# Patient Record
Sex: FEMALE | Race: OTHER | NOT HISPANIC OR LATINO
[De-identification: names, ages, dates, MRNs, and addresses within clinical notes are randomized per-mention and may not be internally consistent; named-entity substitution may affect disease eponyms.]

---

## 2017-10-11 ENCOUNTER — TRANSCRIPTION ENCOUNTER (OUTPATIENT)
Age: 37
End: 2017-10-11

## 2017-12-06 ENCOUNTER — APPOINTMENT (OUTPATIENT)
Dept: BARIATRICS | Facility: CLINIC | Age: 37
End: 2017-12-06

## 2017-12-11 ENCOUNTER — APPOINTMENT (OUTPATIENT)
Dept: BARIATRICS | Facility: CLINIC | Age: 37
End: 2017-12-11

## 2017-12-17 ENCOUNTER — TRANSCRIPTION ENCOUNTER (OUTPATIENT)
Age: 37
End: 2017-12-17

## 2018-03-28 ENCOUNTER — EMERGENCY (EMERGENCY)
Facility: HOSPITAL | Age: 38
LOS: 1 days | Discharge: HOME | End: 2018-03-28
Attending: EMERGENCY MEDICINE | Admitting: EMERGENCY MEDICINE
Payer: COMMERCIAL

## 2018-03-28 VITALS
TEMPERATURE: 98 F | OXYGEN SATURATION: 99 % | DIASTOLIC BLOOD PRESSURE: 87 MMHG | HEART RATE: 70 BPM | RESPIRATION RATE: 18 BRPM | WEIGHT: 220.02 LBS | SYSTOLIC BLOOD PRESSURE: 135 MMHG

## 2018-03-28 DIAGNOSIS — R21 RASH AND OTHER NONSPECIFIC SKIN ERUPTION: ICD-10-CM

## 2018-03-28 DIAGNOSIS — Z88.8 ALLERGY STATUS TO OTHER DRUGS, MEDICAMENTS AND BIOLOGICAL SUBSTANCES: ICD-10-CM

## 2018-03-28 DIAGNOSIS — Z98.890 OTHER SPECIFIED POSTPROCEDURAL STATES: Chronic | ICD-10-CM

## 2018-03-28 DIAGNOSIS — L25.9 UNSPECIFIED CONTACT DERMATITIS, UNSPECIFIED CAUSE: ICD-10-CM

## 2018-03-28 DIAGNOSIS — Z86.018 PERSONAL HISTORY OF OTHER BENIGN NEOPLASM: Chronic | ICD-10-CM

## 2018-03-28 PROCEDURE — 99283 EMERGENCY DEPT VISIT LOW MDM: CPT

## 2018-03-28 RX ORDER — HYDROCORTISONE 1 %
1 OINTMENT (GRAM) TOPICAL
Qty: 30 | Refills: 0 | OUTPATIENT
Start: 2018-03-28 | End: 2018-04-10

## 2018-03-28 NOTE — ED ADULT NURSE NOTE - OBJECTIVE STATEMENT
Pt c/o rash to bilateral thighs since yesterday, states she used a new soap yesterday. Denies any respiratory symptoms or itching anywhere else.

## 2018-03-28 NOTE — ED PROVIDER NOTE - OBJECTIVE STATEMENT
Patient is a 37 year old female with no significant past medical history presenting with "Rash" on Thighs.     Patient reports on Monday, March 26, Used "New Body Soap", and Noticed That Skin was Dry Afterwards. Patient reports on Tuesday, March 27, Noticed Itchiness on Anterior/Medial, Lateral Thighs Bilaterally, and Reports Itching All Day. Patient reports Today, Noticing, "Blotches" of Red on Thighs Bilaterally, and   "Feels Warm" To Touch, and Slightly Painful. Patient denies Fever, Chills, Skin Wounds on Bilateral Lower Extremities. Patient denies Extravasation of Fluid from Rashes, or Bleeding. Besides New Soap, Patient denies New Medications, New Detergents, New Clothing, Travel, or Extended Sun Exposure.     Patient denies Nausea, Vomiting, Chest Pain, SOB, Cough, Palpitations, Abdominal Pain, Diarrhea, Constipation, Dysuria, Hematuria. Patient is a 37 year old female with no significant past medical history presenting with "Rash" on Thighs.     Patient reports on Monday, March 26, Used "New Body Soap", and Noticed That Skin was Dry Afterwards. Patient reports on Tuesday, March 27, Noticed Itchiness on Anterior/Medial, Lateral Thighs Bilaterally, and Reports Itching All Day. Patient reports Today, Noticing, "Blotches" of Red on Thighs Bilaterally, and   "Feels Warm" To Touch, and Slightly Painful. Patient reports Tried "[place] Butter", Helped Dry Skin, but Still Itchy. Patient denies Fever, Chills, Skin Wounds on Bilateral Lower Extremities. Patient denies Extravasation of Fluid from Rashes, or Bleeding. Besides New Soap, Patient denies New Medications, New Detergents, New Clothing, Travel, or Extended Sun Exposure.     Patient denies Nausea, Vomiting, Chest Pain, SOB, Cough, Palpitations, Abdominal Pain, Diarrhea, Constipation, Dysuria, Hematuria.

## 2018-03-28 NOTE — ED PROVIDER NOTE - ATTENDING CONTRIBUTION TO CARE
38yo F no PMH here w/ rash on anterior medio/lateral thighs. +new soap recently. initially dry and scratchy, then became red, slightly warm, so came in for eval. no other exposures, no travel. ROS otherwise neg. VSS, on exam anterior thighs w/ mild erythema, no warmth, no bleeding, macular in appearance. likely contact dermatitis. pt tried cocoa butter. will trial low dose steroid and benadryl cream for itching, do not think required oral medications at this time, does not look like cellulitis at this time either. DC home in [facility] with strict return precautions given.

## 2018-03-28 NOTE — ED PROVIDER NOTE - PHYSICAL EXAMINATION
General: Well Appearing, NAD  HEENT: NCAT, PERRLA B/L, MMM, No Oral Lesions  Neck: Supple  Heart: Normal S1+S2, RRR, No M/R/G  Lungs: CTA B/L, No W/R/R  Abdomen: Soft, NT/ND  Extremities: +Macular Rash on Bilateral Thighs, Not Warm, No Purulence or Drainage, Non-Tender to Palpation, No Lacerations or Excoriations  Skin: As Above

## 2018-03-28 NOTE — ED PROVIDER NOTE - CHPI ED SYMPTOMS NEG
no inflammation/no bruising/no petechia/no scaly patches on skin/no chills/no decreased eating/drinking/no vomiting/no fever

## 2018-03-28 NOTE — ED PROVIDER NOTE - MEDICAL DECISION MAKING DETAILS
Based on History and Examination, likely Contact Dermatitis. Will Prescribe Topical Steroid and Benadryl, and Discharge Home.

## 2018-07-31 ENCOUNTER — TRANSCRIPTION ENCOUNTER (OUTPATIENT)
Age: 38
End: 2018-07-31

## 2018-11-01 ENCOUNTER — APPOINTMENT (OUTPATIENT)
Dept: BARIATRICS | Facility: CLINIC | Age: 38
End: 2018-11-01
Payer: COMMERCIAL

## 2018-11-01 VITALS
HEIGHT: 66 IN | DIASTOLIC BLOOD PRESSURE: 80 MMHG | OXYGEN SATURATION: 99 % | BODY MASS INDEX: 41.16 KG/M2 | HEART RATE: 73 BPM | WEIGHT: 256.13 LBS | TEMPERATURE: 98.6 F | SYSTOLIC BLOOD PRESSURE: 126 MMHG

## 2018-11-01 PROCEDURE — 99203 OFFICE O/P NEW LOW 30 MIN: CPT

## 2018-11-07 ENCOUNTER — APPOINTMENT (OUTPATIENT)
Dept: BARIATRICS | Facility: CLINIC | Age: 38
End: 2018-11-07

## 2019-01-09 ENCOUNTER — APPOINTMENT (OUTPATIENT)
Dept: BARIATRICS | Facility: CLINIC | Age: 39
End: 2019-01-09

## 2019-01-09 VITALS — BODY MASS INDEX: 41.3 KG/M2 | WEIGHT: 257 LBS | HEIGHT: 66 IN

## 2019-01-10 ENCOUNTER — APPOINTMENT (OUTPATIENT)
Dept: BARIATRICS | Facility: CLINIC | Age: 39
End: 2019-01-10

## 2019-03-13 ENCOUNTER — APPOINTMENT (OUTPATIENT)
Dept: OBGYN | Facility: CLINIC | Age: 39
End: 2019-03-13

## 2019-04-17 ENCOUNTER — TRANSCRIPTION ENCOUNTER (OUTPATIENT)
Age: 39
End: 2019-04-17

## 2019-09-11 ENCOUNTER — APPOINTMENT (OUTPATIENT)
Dept: OBGYN | Facility: CLINIC | Age: 39
End: 2019-09-11

## 2020-02-05 ENCOUNTER — APPOINTMENT (OUTPATIENT)
Dept: OBGYN | Facility: CLINIC | Age: 40
End: 2020-02-05
Payer: COMMERCIAL

## 2020-02-05 DIAGNOSIS — Z01.419 ENCOUNTER FOR GYNECOLOGICAL EXAMINATION (GENERAL) (ROUTINE) W/OUT ABNORMAL FINDINGS: ICD-10-CM

## 2020-02-05 DIAGNOSIS — R93.89 ABNORMAL FINDINGS ON DIAGNOSTIC IMAGING OF OTHER SPECIFIED BODY STRUCTURES: ICD-10-CM

## 2020-02-05 PROCEDURE — 99386 PREV VISIT NEW AGE 40-64: CPT

## 2020-02-05 NOTE — PHYSICAL EXAM
[Awake] : awake [Alert] : alert [Soft] : soft [Oriented x3] : oriented to person, place, and time [No Bleeding] : there was no active vaginal bleeding [Pap Obtained] : a Pap smear was performed [Uterine Adnexae] : were not tender and not enlarged [Acute Distress] : no acute distress [Mass] : no breast mass [Nipple Discharge] : no nipple discharge [Axillary LAD] : no axillary lymphadenopathy [Tender] : non tender [Labia Majora] : labia major [Labia Minora] : labia minora [Normal] : clitoris [Normal Position] : in a normal position [Tenderness] : nontender [Enlarged ___ wks] : not enlarged [Adnexa Tenderness] : were not tender [Ovarian Mass (___ Cm)] : there were no adnexal masses

## 2020-02-11 LAB — CYTOLOGY CVX/VAG DOC THIN PREP: NORMAL

## 2020-04-26 ENCOUNTER — MESSAGE (OUTPATIENT)
Age: 40
End: 2020-04-26

## 2020-05-06 ENCOUNTER — APPOINTMENT (OUTPATIENT)
Dept: DISASTER EMERGENCY | Facility: HOSPITAL | Age: 40
End: 2020-05-06

## 2020-05-13 LAB
SARS-COV-2 IGG SERPL IA-ACNC: 0.1 INDEX
SARS-COV-2 IGG SERPL QL IA: NEGATIVE

## 2020-06-19 ENCOUNTER — APPOINTMENT (OUTPATIENT)
Dept: OBGYN | Facility: CLINIC | Age: 40
End: 2020-06-19
Payer: COMMERCIAL

## 2020-06-19 VITALS
WEIGHT: 262 LBS | HEIGHT: 66 IN | SYSTOLIC BLOOD PRESSURE: 120 MMHG | BODY MASS INDEX: 42.11 KG/M2 | DIASTOLIC BLOOD PRESSURE: 80 MMHG

## 2020-06-19 PROCEDURE — 76817 TRANSVAGINAL US OBSTETRIC: CPT

## 2020-06-19 PROCEDURE — 99213 OFFICE O/P EST LOW 20 MIN: CPT | Mod: 25

## 2020-06-19 NOTE — PROCEDURE
[Intrauterine Pregnancy] : intrauterine pregnancy [Yolk Sac] : yolk sac present [Fetal Heart] : fetal heart present [CRL: ___ (mm)] : CRL - [unfilled]Umm [Date: ___] : EDC: [unfilled] [Current GA by Sonogram: ___ (wks)] : Current GA by Sonogram: [unfilled]Uwks [___ day(s)] : [unfilled] days [WNL] : Transvaginal OB Sonogram WNL [FreeTextEntry1] : (+) FHR OBSERVED.  i DIDN'T USE AUDIBLE DOPPLER BECAUSE MRS. JEAN DIDN'T WANT TO SEE OR HEAR THE HEARTBEAT FOR EMOTIONAL REASONS.

## 2020-06-19 NOTE — CHIEF COMPLAINT
[Follow Up] : follow up GYN visit [FreeTextEntry1] : PATIENT PRESENT FOR GYN FOLLOW UP VISIT. PATIENT STATES SHE MISS HER MENSES FOR MAY ,2020. PATIENT LMP IS 04/15/2020. PATIENT IS 9 WEEKS 2 DAYS TODAY FROM LMP. PATIENT PREGNANCY TEST WAS POSTIVE TODAY.

## 2020-07-29 ENCOUNTER — APPOINTMENT (OUTPATIENT)
Dept: OBGYN | Facility: CLINIC | Age: 40
End: 2020-07-29
Payer: COMMERCIAL

## 2020-07-29 DIAGNOSIS — Z00.00 ENCOUNTER FOR GENERAL ADULT MEDICAL EXAMINATION W/OUT ABNORMAL FINDINGS: ICD-10-CM

## 2020-07-29 PROCEDURE — 99396 PREV VISIT EST AGE 40-64: CPT

## 2020-07-29 PROCEDURE — 36415 COLL VENOUS BLD VENIPUNCTURE: CPT

## 2020-08-11 ENCOUNTER — APPOINTMENT (OUTPATIENT)
Dept: MAMMOGRAPHY | Facility: HOSPITAL | Age: 40
End: 2020-08-11

## 2020-08-13 LAB
ANDROSTANOLONE SERPL-MCNC: 6.3 NG/DL
ESTRADIOL SERPL-MCNC: 25 PG/ML
FIBRINOGEN AG PPP IA-MCNC: 326 MG/DL
FSH SERPL-MCNC: 5.4 IU/L
HCG SERPL-MCNC: 1 MIU/ML
LH SERPL-ACNC: 3.3 IU/L
PROGEST SERPL-MCNC: 0.2 NG/ML
PROLACTIN SERPL-MCNC: 17.8 NG/ML
T3 SERPL-MCNC: 105 NG/DL
T3FREE SERPL-MCNC: 2.71 PG/ML
T4 FREE SERPL-MCNC: 0.9 NG/DL
T4 SERPL-MCNC: 5.8 UG/DL
TSH SERPL-ACNC: 1.31 UIU/ML

## 2020-08-17 NOTE — HISTORY OF PRESENT ILLNESS
[Definite:  ___ (Date)] : the last menstrual period was [unfilled] [Spotting Between  Menses] : spotting reported between menses [Normal Amount/Duration] : was of a normal amount and duration [Regular Cycle Intervals] : periods have been regular [Menstrual Cramps] : no menstrual cramps [On BCP at conception] : the patient was not on BCP at conception [Contraception] : does not use contraception

## 2020-10-15 ENCOUNTER — APPOINTMENT (OUTPATIENT)
Dept: BARIATRICS | Facility: CLINIC | Age: 40
End: 2020-10-15
Payer: COMMERCIAL

## 2020-10-15 PROCEDURE — 99214 OFFICE O/P EST MOD 30 MIN: CPT

## 2020-12-02 ENCOUNTER — EMERGENCY (EMERGENCY)
Facility: HOSPITAL | Age: 40
LOS: 1 days | Discharge: HOME | End: 2020-12-02
Admitting: EMERGENCY MEDICINE
Payer: COMMERCIAL

## 2020-12-02 VITALS
SYSTOLIC BLOOD PRESSURE: 135 MMHG | TEMPERATURE: 98 F | HEIGHT: 67 IN | HEART RATE: 90 BPM | DIASTOLIC BLOOD PRESSURE: 84 MMHG | RESPIRATION RATE: 18 BRPM | OXYGEN SATURATION: 99 %

## 2020-12-02 DIAGNOSIS — Z98.890 OTHER SPECIFIED POSTPROCEDURAL STATES: Chronic | ICD-10-CM

## 2020-12-02 DIAGNOSIS — Z86.018 PERSONAL HISTORY OF OTHER BENIGN NEOPLASM: Chronic | ICD-10-CM

## 2020-12-02 LAB — SARS-COV-2 RNA SPEC QL NAA+PROBE: SIGNIFICANT CHANGE UP

## 2020-12-02 PROCEDURE — U0003: CPT

## 2020-12-02 PROCEDURE — 99283 EMERGENCY DEPT VISIT LOW MDM: CPT

## 2020-12-02 NOTE — ED PROVIDER NOTE - PATIENT PORTAL LINK FT
You can access the FollowMyHealth Patient Portal offered by [facility] by registering at the following website: [email or web address]. By joining [employer]’s FollowMyHealth portal, you will also be able to view your health information using other applications (apps) compatible with our system.

## 2020-12-06 DIAGNOSIS — Z20.828 CONTACT WITH AND (SUSPECTED) EXPOSURE TO OTHER VIRAL COMMUNICABLE DISEASES: ICD-10-CM

## 2020-12-06 DIAGNOSIS — Z88.8 ALLERGY STATUS TO OTHER DRUGS, MEDICAMENTS AND BIOLOGICAL SUBSTANCES: ICD-10-CM

## 2020-12-24 ENCOUNTER — EMERGENCY (EMERGENCY)
Facility: HOSPITAL | Age: 40
LOS: 1 days | Discharge: HOME | End: 2020-12-24
Admitting: EMERGENCY MEDICINE
Payer: COMMERCIAL

## 2020-12-24 VITALS
TEMPERATURE: 99 F | HEIGHT: 67 IN | WEIGHT: 257.06 LBS | OXYGEN SATURATION: 97 % | DIASTOLIC BLOOD PRESSURE: 82 MMHG | HEART RATE: 80 BPM | SYSTOLIC BLOOD PRESSURE: 125 MMHG | RESPIRATION RATE: 17 BRPM
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
## 2020-12-24 DIAGNOSIS — M25.561 PAIN IN RIGHT KNEE: ICD-10-CM

## 2020-12-24 DIAGNOSIS — Z98.890 OTHER SPECIFIED POSTPROCEDURAL STATES: Chronic | ICD-10-CM
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
## 2020-12-24 DIAGNOSIS — Z86.018 PERSONAL HISTORY OF OTHER BENIGN NEOPLASM: Chronic | ICD-10-CM

## 2020-12-24 DIAGNOSIS — S83.91XA SPRAIN OF UNSPECIFIED SITE OF RIGHT KNEE, INITIAL ENCOUNTER: ICD-10-CM

## 2020-12-24 PROCEDURE — 99283 EMERGENCY DEPT VISIT LOW MDM: CPT

## 2020-12-24 RX ORDER — IBUPROFEN 200 MG
800 TABLET ORAL ONCE
Refills: 0 | Status: COMPLETED | OUTPATIENT
Start: 2020-12-24 | End: 2020-12-24

## 2020-12-24 RX ADMIN — Medication 800 MILLIGRAM(S): at 13:41

## 2020-12-24 NOTE — ED ADULT NURSE NOTE - NSIMPLEMENTINTERV_GEN_ALL_ED
Implemented All Fall Risk Interventions:  [place] to call system. Call bell, personal items and telephone within reach. Instruct patient to call for assistance. Room bathroom lighting operational. Non-slip footwear when patient is off stretcher. Physically safe environment: no spills, clutter or unnecessary equipment. Stretcher in lowest position, wheels locked, appropriate side rails in place. Provide visual cue, wrist band, yellow gown, etc. Monitor gait and stability. Monitor for mental status changes and reorient to person, place, and time. Review medications for side effects contributing to fall risk. Reinforce activity limits and safety measures with patient and family.

## 2020-12-24 NOTE — ED PROVIDER NOTE - NSFOLLOWUPINSTRUCTIONS_ED_ALL_ED_FT
KNEE SPRAIN - AfterCare(R) Instructions(ER/ED)           Knee Sprain    WHAT YOU NEED TO KNOW:    A knee sprain is a stretched or torn ligament in your knee. Ligaments support the knee and keep the joint and bones in the correct position. A knee sprain may involve one or more ligaments.    Knee Anatomy          DISCHARGE INSTRUCTIONS:    Return to the emergency department if:   •Any part of your leg feels cold, numb, or looks pale.          Call your doctor if:   •You have new or increased swelling, bruising, or pain in your knee.      •Your symptoms do not improve within 6 weeks, even with treatment.      •You have questions or concerns about your condition or care.      Medicines:   •NSAIDs, such as ibuprofen, help decrease swelling, pain, and fever. This medicine is available with or without a doctor's order. NSAIDs can cause stomach bleeding or kidney problems in certain people. If you take blood thinner medicine, always ask your healthcare provider if NSAIDs are safe for you. Always read the medicine label and follow directions.      •Acetaminophen decreases pain and fever. It is available without a doctor's order. Ask how much to take and how often to take it. Follow directions. Read the labels of all other medicines you are using to see if they also contain acetaminophen, or ask your doctor or pharmacist. Acetaminophen can cause liver damage if not taken correctly. Do not use more than 4 grams (4,000 milligrams) total of acetaminophen in one day.       •Prescription pain medicine may be given. Ask your healthcare provider how to take this medicine safely. Some prescription pain medicines contain acetaminophen. Do not take other medicines that contain acetaminophen without talking to your healthcare provider. Too much acetaminophen may cause liver damage. Prescription pain medicine may cause constipation. Ask your healthcare provider how to prevent or treat constipation.       •Take your medicine as directed. Contact your healthcare provider if you think your medicine is not helping or if you have side effects. Tell him or her if you are allergic to any medicine. Keep a list of the medicines, vitamins, and herbs you take. Include the amounts, and when and why you take them. Bring the list or the pill bottles to follow-up visits. Carry your medicine list with you in case of an emergency.      A support device such as a splint or brace may be needed. These devices limit movement and protect the joint while it heals. You may be given crutches to use until you can stand on your injured leg without pain.    Physical therapy may be needed. A physical therapist teaches you exercises to help improve movement and strength, and to decrease pain.    Manage a knee sprain:   •Rest your knee and do not exercise. Do not walk on your injured leg if you are told to keep weight off your knee. Rest helps decrease swelling and allows the injury to heal. You can do gentle range of motion exercises as directed to prevent stiffness.      •Apply ice on your knee for 15 to 20 minutes every hour or as directed. Use an ice pack, or put crushed ice in a plastic bag. Cover the bag with a towel before you apply it. Ice helps prevent tissue damage and decreases swelling and pain.      •Apply compression to your knee as directed. You may need to wear an elastic bandage. This helps keep your injured knee from moving too much while it heals. It should be tight enough to give support but so tight that it causes your toes to feel numb or tingly. Take the bandage off and rewrap it at least 1 time each day.      •Elevate your knee above the level of your heart as often as you can. This will help decrease swelling and pain. Prop your leg on pillows or blankets to keep it elevated comfortably. Do not put pillows directly behind your knee.  Elevate Leg           Prevent another knee sprain: Exercise your legs to keep your muscles strong. Strong leg muscles help protect your knee and prevent strain. The following may also prevent a knee sprain:   •Slowly start your exercise or training program. Slowly increase the time, distance, and intensity of your exercise. Sudden increases in training may cause another knee sprain.      •Wear protective braces and equipment as directed. Braces may prevent your knee from moving the wrong way and causing another sprain. Protective equipment may support your bones and ligaments to prevent injury.      •Warm up and stretch before exercise. Warm up by walking or using an exercise bike before starting your regular exercise. Do gentle stretches after warming up. This helps to loosen your muscles and decrease stress on your knee. Cool down and stretch after you exercise.      •Wear shoes that fit correctly and support your feet. Replace your running or exercise shoes before the padding or shock absorption is worn out. Ask your healthcare provider which exercise shoes are best for you. Ask if you should wear shoe inserts. Shoe inserts can help support your heels and arches or keep your foot lined up correctly in your shoes. Exercise on flat surfaces.      Follow up with your doctor as directed: Write down your questions so you remember to ask them during your visits.

## 2020-12-24 NOTE — ED ADULT TRIAGE NOTE - CHIEF COMPLAINT QUOTE
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 today states she is having difficulty ambulating on her R leg.

## 2020-12-24 NOTE — ED ADULT NURSE NOTE - OBJECTIVE STATEMENT
pt received into spot A A&Ox3 ambulatory appears comfortable arrives via walk in triage for eval of knee pain while pushing a stretcher working in [employer] and hit her knee pain has increased today and is now limping wants to get checked out. Ice packs applied. awaiting md cary in nad

## 2020-12-24 NOTE — ED PROVIDER NOTE - CLINICAL SUMMARY MEDICAL DECISION MAKING FREE TEXT BOX
39 yo female in the ER with right knee pain since yesterday. Pt reports she felt some with twisting movement of her knee yesterday at work. Denies any fall or direct knee injury. Pt concerned that today pain became worse. No deformity, edema opr discoloration noted to painful joint, no prior knee injury.  exam- no clinical suspicion for fx, dislocation, infection, or tear of the ligaments, no findings to suspect DVT. most likely sprain. plan : NSAIDs for pain, ACE bandage for support, rest, out pt f/u with ortho.

## 2020-12-24 NOTE — ED PROVIDER NOTE - MUSCULOSKELETAL, MLM
Spine appears normal, range of motion is not limited, right knee - no deformity, no effusion, no erythema, NROM with generalized tenderness, good distal pulse sand no calf tenderness.

## 2020-12-24 NOTE — ED PROVIDER NOTE - OBJECTIVE STATEMENT
39 yo female in the ER with right knee pain since yesterday. Pt reports she felt some with twisting movement of her knee yesterday at work. Denies any fall or direct knee injury. Pt concerned that today pain became worse. No deformity, edema opr discoloration noted to painful joint, no prior knee injury.

## 2020-12-24 NOTE — ED PROVIDER NOTE - PATIENT PORTAL LINK FT
You can access the FollowMyHealth Patient Portal offered by [facility] by registering at the following website: [email or web address]. By joining [employer]’s FollowMyHealth portal, you will also be able to view your health information using other applications (apps) compatible with our system.

## 2020-12-27 NOTE — HISTORY OF PRESENT ILLNESS
[Home] : at home, [unfilled] , at the time of the visit. [Medical Office: ([facility])___] : at the medical office located in  [Verbal consent obtained from patient] : the patient, [unfilled] [de-identified] : Patient is a 40 year old female with along history of morbid obesity not responsive to multiple dietary regimens. She stands 5' 6", weighs 256 pounds and has a BMI of 41.3. Her weight-related comorbidities include GERD, osteoarthritis and stress urinary incontinence.

## 2020-12-27 NOTE — REVIEW OF SYSTEMS
[Reflux/Heartburn] : reflux/heartburn [Dysuria] : no dysuria [Incontinence] : incontinence [Vaginal Discharge] : no vaginal discharge [Dysmenorrhea/Abn Vaginal Bleeding] : no dysmenorrhea/abnormal vaginal bleeding [Joint Pain] : joint pain [Joint Stiffness] : joint stiffness [Muscle Pain] : no muscle pain [Muscle Weakness] : no muscle weakness [Negative] : Psychiatric

## 2020-12-27 NOTE — ASSESSMENT
[FreeTextEntry1] : She meets the NIH criteria for bariatric surgery and would like to have a laparoscopic sleeve gastrectomy. \par I have reviewed the risks and benefits of the procedure with the patient, and she understands this information fully.

## 2021-01-24 ENCOUNTER — TRANSCRIPTION ENCOUNTER (OUTPATIENT)
Age: 41
End: 2021-01-24

## 2021-02-13 ENCOUNTER — TRANSCRIPTION ENCOUNTER (OUTPATIENT)
Age: 41
End: 2021-02-13

## 2021-05-12 NOTE — ED ADULT NURSE NOTE - NS ED NURSE RECORD ANOTHER HT AND WT
Lyudmila with [facility] stating patient is  admitting tomorrow from [facility].     They sent over exam form but have not recived back. She is resending. Stating they need the most recent H and P as well as an updated med list to be faxed to [phone]    She can be reached at [phone] with any questions.   Yes

## 2021-10-13 ENCOUNTER — APPOINTMENT (OUTPATIENT)
Dept: BARIATRICS | Facility: CLINIC | Age: 41
End: 2021-10-13
Payer: COMMERCIAL

## 2021-10-13 VITALS
HEART RATE: 73 BPM | TEMPERATURE: 97.2 F | BODY MASS INDEX: 41.62 KG/M2 | SYSTOLIC BLOOD PRESSURE: 106 MMHG | DIASTOLIC BLOOD PRESSURE: 67 MMHG | OXYGEN SATURATION: 98 % | HEIGHT: 66 IN | WEIGHT: 259 LBS

## 2021-10-13 DIAGNOSIS — N39.3 STRESS INCONTINENCE (FEMALE) (MALE): ICD-10-CM

## 2021-10-13 DIAGNOSIS — K21.9 GASTRO-ESOPHAGEAL REFLUX DISEASE W/OUT ESOPHAGITIS: ICD-10-CM

## 2021-10-13 DIAGNOSIS — M17.12 UNILATERAL PRIMARY OSTEOARTHRITIS, LEFT KNEE: ICD-10-CM

## 2021-10-13 PROCEDURE — 99213 OFFICE O/P EST LOW 20 MIN: CPT

## 2021-10-28 ENCOUNTER — APPOINTMENT (OUTPATIENT)
Dept: BARIATRICS | Facility: CLINIC | Age: 41
End: 2021-10-28
Payer: COMMERCIAL

## 2021-10-28 VITALS — BODY MASS INDEX: 41.97 KG/M2 | WEIGHT: 260 LBS

## 2021-10-28 PROCEDURE — 97802 MEDICAL NUTRITION INDIV IN: CPT | Mod: 95

## 2021-11-07 NOTE — REVIEW OF SYSTEMS
[Patient Intake Form Reviewed] : Patient intake form was reviewed [Abdominal Pain] : no abdominal pain [Vomiting] : no vomiting [Constipation] : no constipation [Diarrhea] : no diarrhea [Reflux/Heartburn] : reflux/heartburn [Hernia] : no hernia [Negative] : Integumentary

## 2021-11-07 NOTE — HISTORY OF PRESENT ILLNESS
[de-identified] : Patient is a 41 year old female with along history of morbid obesity not responsive to multiple dietary regimens, She has a BMI of 41.8 and weight-related comorbidities incluidng GERD, stress urinary incontinence and osteoarthritis.

## 2021-12-06 ENCOUNTER — APPOINTMENT (OUTPATIENT)
Dept: RADIOLOGY | Facility: HOSPITAL | Age: 41
End: 2021-12-06

## 2022-06-13 ENCOUNTER — NON-APPOINTMENT (OUTPATIENT)
Age: 42
End: 2022-06-13

## 2022-10-31 RX ORDER — NORETHINDRONE ACETATE AND ETHINYL ESTRADIOL AND FERROUS FUMARATE 1MG-20(21)
1-20 KIT ORAL DAILY
Qty: 3 | Refills: 3 | Status: ACTIVE | COMMUNITY
Start: 2020-06-19 | End: 1900-01-01

## 2022-12-21 ENCOUNTER — APPOINTMENT (OUTPATIENT)
Dept: PULMONOLOGY | Facility: CLINIC | Age: 42
End: 2022-12-21

## 2022-12-21 VITALS
HEIGHT: 66 IN | HEART RATE: 86 BPM | DIASTOLIC BLOOD PRESSURE: 84 MMHG | BODY MASS INDEX: 41.78 KG/M2 | WEIGHT: 260 LBS | OXYGEN SATURATION: 97 % | SYSTOLIC BLOOD PRESSURE: 127 MMHG

## 2022-12-21 DIAGNOSIS — R05.9 COUGH, UNSPECIFIED: ICD-10-CM

## 2022-12-21 DIAGNOSIS — E66.01 MORBID (SEVERE) OBESITY DUE TO EXCESS CALORIES: ICD-10-CM

## 2022-12-21 DIAGNOSIS — R06.83 SNORING: ICD-10-CM

## 2022-12-21 PROCEDURE — 99204 OFFICE O/P NEW MOD 45 MIN: CPT | Mod: 25

## 2022-12-21 PROCEDURE — 94727 GAS DIL/WSHOT DETER LNG VOL: CPT

## 2022-12-21 PROCEDURE — 94729 DIFFUSING CAPACITY: CPT

## 2022-12-21 PROCEDURE — 94060 EVALUATION OF WHEEZING: CPT

## 2022-12-21 RX ORDER — ALBUTEROL SULFATE 90 UG/1
108 (90 BASE) INHALANT RESPIRATORY (INHALATION)
Qty: 1 | Refills: 2 | Status: ACTIVE | COMMUNITY
Start: 2022-12-21 | End: 1900-01-01

## 2022-12-21 NOTE — HISTORY OF PRESENT ILLNESS
[Never] : never [TextBox_4] : 42 yr old female GERD, obesity.  Presents today with complaints of asthma like symptoms post COVID. \par \par She had COVID 1/2022 she had mild symptoms without hospitalization.  A few months after COVID she has been coughing, with mucus and itching in the chest, SOB due to cough.  She thinks she has asthma and she has been using albuterol since Sept.  Uses the pump 1 -2 time a month due to chest tightness and feels it helps. \par \par Denies SOB, fever, wheezing, appiette is good. \par \par She snores at times told by her .  Denies waking up gasping for air.  \par \par Mother, brother with asthma.  [ESS] : 9

## 2022-12-21 NOTE — END OF VISIT
[Time Spent: ___ minutes] : I have spent [unfilled] minutes of time on the encounter. [>50% of the face to face encounter time was spent on counseling and/or coordination of care for ___] : Greater than 50% of the face to face encounter time was spent on counseling and/or coordination of care for [unfilled] [FreeTextEntry3] : Pt seen with DAYSI Garduno and agree on the above plan of care.

## 2022-12-21 NOTE — ASSESSMENT
[FreeTextEntry1] : cough\par \par I discussed the differential diagnosis of chronic cough with the patient.  The differential diagnosis include, but not limited to, GERD, postnasal drip, cardiac, and bronchial asthma.  There is no clinical evidence neither of ILD, OLD, thromboembolic disease, nor infectious process.  There is no clinical evidence of GERD nor postnasal drip.  The condition started after a viral infection and the condition could be related to hyperactive airways. \par \par PFT today shows normal Pérez without bronchodilator, lung normal capacity and DLCO normal.  Pt states the albuterol helps her cough gave script to use as needed.  If starts to use often to let me know, 1 a week using to follow up with me.   \par \par snoring\par \par I discussed the short and long term health effect of the obstructive seep apnea with the patient. These effects include, but not limited to, uncontrolled hypertension, CAD, arrhythmias, sudden death, CVA, and pulmonary hypertension. I advised the patient to avoid sedatives, narcotics, driving, and sleeping pills in the meantime. I discussed the therapeutic options including but not limited to CPAP, surgery, and oral appliance. Further recommendations will follow after sleep study.\par

## 2023-01-19 ENCOUNTER — OUTPATIENT (OUTPATIENT)
Dept: OUTPATIENT SERVICES | Facility: HOSPITAL | Age: 43
LOS: 1 days | End: 2023-01-19
Payer: COMMERCIAL

## 2023-01-19 ENCOUNTER — APPOINTMENT (OUTPATIENT)
Dept: SLEEP CENTER | Facility: HOME HEALTH | Age: 43
End: 2023-01-19
Payer: COMMERCIAL

## 2023-01-19 DIAGNOSIS — Z86.018 PERSONAL HISTORY OF OTHER BENIGN NEOPLASM: Chronic | ICD-10-CM

## 2023-01-19 DIAGNOSIS — Z98.890 OTHER SPECIFIED POSTPROCEDURAL STATES: Chronic | ICD-10-CM

## 2023-01-19 DIAGNOSIS — G47.33 OBSTRUCTIVE SLEEP APNEA (ADULT) (PEDIATRIC): ICD-10-CM

## 2023-01-19 PROCEDURE — 95800 SLP STDY UNATTENDED: CPT | Mod: 26

## 2023-01-19 PROCEDURE — 95800 SLP STDY UNATTENDED: CPT

## 2023-01-20 DIAGNOSIS — G47.33 OBSTRUCTIVE SLEEP APNEA (ADULT) (PEDIATRIC): ICD-10-CM

## 2023-01-30 PROBLEM — G47.33 OSA (OBSTRUCTIVE SLEEP APNEA): Status: ACTIVE | Noted: 2023-01-30

## 2023-03-09 ENCOUNTER — APPOINTMENT (OUTPATIENT)
Dept: OBGYN | Facility: CLINIC | Age: 43
End: 2023-03-09

## 2023-03-30 ENCOUNTER — APPOINTMENT (OUTPATIENT)
Dept: OBGYN | Facility: CLINIC | Age: 43
End: 2023-03-30

## 2023-05-01 ENCOUNTER — APPOINTMENT (OUTPATIENT)
Dept: OBGYN | Facility: CLINIC | Age: 43
End: 2023-05-01
Payer: COMMERCIAL

## 2023-05-01 VITALS
HEART RATE: 90 BPM | WEIGHT: 264 LBS | OXYGEN SATURATION: 97 % | BODY MASS INDEX: 42.61 KG/M2 | DIASTOLIC BLOOD PRESSURE: 84 MMHG | SYSTOLIC BLOOD PRESSURE: 119 MMHG

## 2023-05-01 DIAGNOSIS — Z64.0 PROBLEMS RELATED TO UNWANTED PREGNANCY: ICD-10-CM

## 2023-05-01 DIAGNOSIS — Z34.90 ENCOUNTER FOR SUPERVISION OF NORMAL PREGNANCY, UNSPECIFIED, UNSPECIFIED TRIMESTER: ICD-10-CM

## 2023-05-01 DIAGNOSIS — Z12.4 ENCOUNTER FOR SCREENING FOR MALIGNANT NEOPLASM OF CERVIX: ICD-10-CM

## 2023-05-01 DIAGNOSIS — Z30.9 ENCOUNTER FOR CONTRACEPTIVE MANAGEMENT, UNSPECIFIED: ICD-10-CM

## 2023-05-01 DIAGNOSIS — Z11.3 ENCOUNTER FOR SCREENING FOR INFECTIONS WITH A PREDOMINANTLY SEXUAL MODE OF TRANSMISSION: ICD-10-CM

## 2023-05-01 PROCEDURE — 99396 PREV VISIT EST AGE 40-64: CPT

## 2023-05-01 RX ORDER — NORETHINDRONE ACETATE AND ETHINYL ESTRADIOL AND FERROUS FUMARATE 1MG-20(24)
1-20 KIT ORAL DAILY
Qty: 3 | Refills: 3 | Status: ACTIVE | COMMUNITY
Start: 2023-05-01 | End: 1900-01-01

## 2023-05-02 LAB
HBV SURFACE AB SERPL IA-ACNC: 52.5 MIU/ML
HBV SURFACE AG SER QL: NONREACTIVE
HCV AB SER QL: NONREACTIVE
HCV S/CO RATIO: 0.09 S/CO
HIV1+2 AB SPEC QL IA.RAPID: NONREACTIVE
HPV HIGH+LOW RISK DNA PNL CVX: NOT DETECTED
T PALLIDUM AB SER QL IA: NEGATIVE

## 2023-05-03 NOTE — PLAN
[FreeTextEntry1] : Ms. Sotelo presents for well woman's  exam. Patient is clinically doing well with no acute gyn complaints. \par - Vitals reviewed and within normal limits. \par - Breast exam, pelvic exam and pap smear performed today. \par - Patient is up to date with mammogram. \par - Contraception counseling performed. Patient elects to use OCPs as this method has worked well for her in the past. She has no contraindications. Precautions given. \par - Patient preventative health actions reviewed-  encouraged well balanced diet and weight bearing exercise. \par  \par Return to the office pending results, as needed for GYN concerns and in 1 year for annual follow up. Plan of care discussed with patient who has no additional questions and is in agreement.\par

## 2023-05-03 NOTE — HISTORY OF PRESENT ILLNESS
[Frequency: Q ___ days] : menstrual periods occur approximately every [unfilled] days [No] : Patient does not have concerns regarding sex [Currently Active] : currently active [Men] : men [Patient would like to be screened for STIs] : Patient would like to be screened for STIs [Patient reported mammogram was normal] : Patient reported mammogram was normal [Patient reported breast sonogram was normal] : Patient reported breast sonogram was normal [Patient reported PAP Smear was normal] : Patient reported PAP Smear was normal [Mammogramdate] : 08/2022 [BreastSonogramDate] : 08/2022 [PapSmeardate] : 02/05/2020 [FreeTextEntry1] : 04/10/2023

## 2023-05-03 NOTE — PHYSICAL EXAM
[Chaperone Present] : A chaperone was present in the examining room during all aspects of the physical examination [Appropriately responsive] : appropriately responsive [Alert] : alert [No Acute Distress] : no acute distress [Soft] : soft [Non-tender] : non-tender [No Lesions] : no lesions [No Mass] : no mass [Oriented x3] : oriented x3 [FreeTextEntry3] : nontender thyroid  [Examination Of The Breasts] : a normal appearance [No Masses] : no breast masses were palpable [Labia Majora] : normal [Labia Minora] : normal [No Bleeding] : There was no active vaginal bleeding [Normal] : normal [Uterine Adnexae] : non-palpable [FreeTextEntry8] : Nontender, no CMT, no adnexal masses or fullness appreciated.

## 2023-05-08 ENCOUNTER — NON-APPOINTMENT (OUTPATIENT)
Age: 43
End: 2023-05-08

## 2023-05-09 LAB
A VAGINAE DNA VAG QL NAA+PROBE: NORMAL
BVAB2 DNA VAG QL NAA+PROBE: NORMAL
C KRUSEI DNA VAG QL NAA+PROBE: NEGATIVE
C TRACH RRNA SPEC QL NAA+PROBE: NEGATIVE
CYTOLOGY CVX/VAG DOC THIN PREP: NORMAL
MEGA1 DNA VAG QL NAA+PROBE: NORMAL
N GONORRHOEA RRNA SPEC QL NAA+PROBE: NEGATIVE
T VAGINALIS RRNA SPEC QL NAA+PROBE: NEGATIVE

## 2023-08-28 NOTE — PHYSICAL EXAM
Radha Villa is a 63 year old female presenting for   Chief Complaint   Patient presents with   • Office Visit     Bilateral ear feels clogged and bags under both eyes     Denies Latex allergy or sensitivity.    Medication verified, no changes  Refills needed today: No    Health Maintenance Due   Topic Date Due   • COVID-19 Vaccine (4 - Pfizer series) 02/01/2022       Patient is up to date, no discussion needed.          Last lab results:   Hemoglobin A1C (%)   Date Value   07/17/2023 5.7 (H)     Cholesterol (mg/dL)   Date Value   07/17/2023 211 (H)     HDL (mg/dL)   Date Value   07/17/2023 89     Triglycerides (mg/dL)   Date Value   07/17/2023 66     LDL (mg/dL)   Date Value   07/17/2023 109     Creatinine, Urine (mg/dL)   Date Value   07/17/2023 17.20     No results found for: \"IFOB\"               Depression Screening:  Review Flowsheet  More data exists       8/28/2023   PHQ 2/9 Score   Adult PHQ 2 Score 0   Adult PHQ 2 Interpretation No further screening needed   Little interest or pleasure in activity? Not at all   Feeling down, depressed or hopeless? Not at all        Advance Directives:  discussed and advised the patient to complete one    [Labia Majora] : labia major [Labia Minora] : labia minora [Normal] : clitoris [No Bleeding] : there was no active vaginal bleeding [Anteversion] : anteverted [Enlarged ___ wks] : enlarged [unfilled] ~Uweeks [Uterine Adnexae] : were not tender and not enlarged [Discharge] : had no discharge [Pap Obtained] : a Pap smear was not performed [Motion Tenderness] : there was no cervical motion tenderness [Mass ___ cm] : no uterine mass was palpated [Tenderness] : nontender [Adnexa Tenderness] : were not tender [Ovarian Mass (___ Cm)] : there were no adnexal masses

## 2024-03-15 RX ORDER — NORETHINDRONE ACETATE AND ETHINYL ESTRADIOL, AND FERROUS FUMARATE 1MG-20(24)
1-20 KIT ORAL
Qty: 3 | Refills: 3 | Status: ACTIVE | COMMUNITY
Start: 2024-03-15 | End: 1900-01-01

## 2025-02-27 ENCOUNTER — EMERGENCY (EMERGENCY)
Facility: HOSPITAL | Age: 45
LOS: 1 days | Discharge: HOME | End: 2025-02-27
Admitting: EMERGENCY MEDICINE
Payer: COMMERCIAL

## 2025-02-27 VITALS
HEIGHT: 67 IN | TEMPERATURE: 99 F | WEIGHT: 259.93 LBS | SYSTOLIC BLOOD PRESSURE: 136 MMHG | DIASTOLIC BLOOD PRESSURE: 89 MMHG | RESPIRATION RATE: 18 BRPM | HEART RATE: 83 BPM | OXYGEN SATURATION: 97 %

## 2025-02-27 DIAGNOSIS — Z86.018 PERSONAL HISTORY OF OTHER BENIGN NEOPLASM: Chronic | ICD-10-CM

## 2025-02-27 DIAGNOSIS — Z98.890 OTHER SPECIFIED POSTPROCEDURAL STATES: Chronic | ICD-10-CM

## 2025-02-27 LAB
FLUAV AG NPH QL: SIGNIFICANT CHANGE UP
FLUBV AG NPH QL: SIGNIFICANT CHANGE UP
RSV RNA NPH QL NAA+NON-PROBE: SIGNIFICANT CHANGE UP
SARS-COV-2 RNA SPEC QL NAA+PROBE: SIGNIFICANT CHANGE UP

## 2025-02-27 PROCEDURE — 99283 EMERGENCY DEPT VISIT LOW MDM: CPT

## 2025-02-27 PROCEDURE — 87637 SARSCOV2&INF A&B&RSV AMP PRB: CPT

## 2025-03-02 DIAGNOSIS — Z88.3 ALLERGY STATUS TO OTHER ANTI-INFECTIVE AGENTS: ICD-10-CM

## 2025-03-02 DIAGNOSIS — R05.1 ACUTE COUGH: ICD-10-CM

## 2025-03-02 DIAGNOSIS — I10 ESSENTIAL (PRIMARY) HYPERTENSION: ICD-10-CM

## 2025-03-02 DIAGNOSIS — J06.9 ACUTE UPPER RESPIRATORY INFECTION, UNSPECIFIED: ICD-10-CM

## 2025-03-22 NOTE — ED PROVIDER NOTE - NS ED MD DISPO DISCHARGE CCDA
Problem: Chronic Conditions and Co-morbidities  Goal: Patient's chronic conditions and co-morbidity symptoms are monitored and maintained or improved  Flowsheets (Taken 3/22/2025 1134)  Care Plan - Patient's Chronic Conditions and Co-Morbidity Symptoms are Monitored and Maintained or Improved:   Monitor and assess patient's chronic conditions and comorbid symptoms for stability, deterioration, or improvement   Collaborate with multidisciplinary team to address chronic and comorbid conditions and prevent exacerbation or deterioration   Update acute care plan with appropriate goals if chronic or comorbid symptoms are exacerbated and prevent overall improvement and discharge  Note: Heme-onc is following for patient history of metastatic endometrial cancer. For chronic diabetes, blood glucose is being monitored AC/HS as ordered. Insulin per SS. No insulin coverage required during this shift.      Problem: Discharge Planning  Goal: Discharge to home or other facility with appropriate resources  Flowsheets  Taken 3/22/2025 1249  Discharge to home or other facility with appropriate resources:   Identify barriers to discharge with patient and caregiver   Arrange for needed discharge resources and transportation as appropriate  Taken 3/22/2025 1134  Discharge to home or other facility with appropriate resources:   Identify barriers to discharge with patient and caregiver   Arrange for needed discharge resources and transportation as appropriate  Note: When medically ready for discharge, patient will return to skilled nursing facility. Case management is following for discharge planning.      Problem: Skin/Tissue Integrity  Goal: Skin integrity remains intact  Description: 1.  Monitor for areas of redness and/or skin breakdown  2.  Assess vascular access sites hourly  3.  Every 4-6 hours minimum:  Change oxygen saturation probe site  4.  Every 4-6 hours:  If on nasal continuous positive airway pressure, respiratory therapy  Patient/Caregiver provided printed discharge information.

## 2025-04-14 ENCOUNTER — NON-APPOINTMENT (OUTPATIENT)
Age: 45
End: 2025-04-14

## 2025-05-05 ENCOUNTER — APPOINTMENT (OUTPATIENT)
Dept: OBGYN | Facility: CLINIC | Age: 45
End: 2025-05-05

## 2025-07-11 ENCOUNTER — APPOINTMENT (OUTPATIENT)
Dept: OBGYN | Facility: CLINIC | Age: 45
End: 2025-07-11

## 2025-07-11 VITALS
OXYGEN SATURATION: 100 % | SYSTOLIC BLOOD PRESSURE: 121 MMHG | HEART RATE: 78 BPM | DIASTOLIC BLOOD PRESSURE: 62 MMHG | BODY MASS INDEX: 42.75 KG/M2 | WEIGHT: 266 LBS | HEIGHT: 66 IN

## 2025-07-11 PROBLEM — R10.2 FEMALE PELVIC PAIN: Status: ACTIVE | Noted: 2025-07-11

## 2025-07-11 PROBLEM — Z12.39 BREAST CANCER SCREENING: Status: ACTIVE | Noted: 2025-07-11

## 2025-07-11 PROBLEM — Z12.11 COLON CANCER SCREENING: Status: ACTIVE | Noted: 2025-07-11

## 2025-07-11 PROCEDURE — 99396 PREV VISIT EST AGE 40-64: CPT

## 2025-07-11 PROCEDURE — 99459 PELVIC EXAMINATION: CPT

## 2025-07-14 LAB — HPV HIGH+LOW RISK DNA PNL CVX: NOT DETECTED

## 2025-07-17 LAB — CYTOLOGY CVX/VAG DOC THIN PREP: NORMAL

## 2025-09-12 ENCOUNTER — APPOINTMENT (OUTPATIENT)
Dept: GASTROENTEROLOGY | Facility: CLINIC | Age: 45
End: 2025-09-12